# Patient Record
Sex: MALE
[De-identification: names, ages, dates, MRNs, and addresses within clinical notes are randomized per-mention and may not be internally consistent; named-entity substitution may affect disease eponyms.]

---

## 2019-03-09 PROBLEM — Z00.00 ENCOUNTER FOR PREVENTIVE HEALTH EXAMINATION: Status: ACTIVE | Noted: 2019-03-09

## 2019-04-23 ENCOUNTER — APPOINTMENT (OUTPATIENT)
Dept: ORTHOPEDIC SURGERY | Facility: CLINIC | Age: 39
End: 2019-04-23

## 2019-05-24 ENCOUNTER — APPOINTMENT (OUTPATIENT)
Dept: ORTHOPEDIC SURGERY | Facility: CLINIC | Age: 39
End: 2019-05-24

## 2023-06-02 ENCOUNTER — EMERGENCY (EMERGENCY)
Facility: HOSPITAL | Age: 43
LOS: 1 days | Discharge: LEFT BEFORE DISCHARGE | End: 2023-06-02
Attending: EMERGENCY MEDICINE | Admitting: EMERGENCY MEDICINE
Payer: COMMERCIAL

## 2023-06-02 ENCOUNTER — EMERGENCY (EMERGENCY)
Facility: HOSPITAL | Age: 43
LOS: 1 days | Discharge: HOME | End: 2023-06-02
Attending: EMERGENCY MEDICINE | Admitting: EMERGENCY MEDICINE
Payer: COMMERCIAL

## 2023-06-02 VITALS
RESPIRATION RATE: 17 BRPM | SYSTOLIC BLOOD PRESSURE: 129 MMHG | OXYGEN SATURATION: 98 % | TEMPERATURE: 98 F | DIASTOLIC BLOOD PRESSURE: 83 MMHG | HEART RATE: 84 BPM

## 2023-06-02 VITALS
SYSTOLIC BLOOD PRESSURE: 133 MMHG | HEART RATE: 92 BPM | TEMPERATURE: 98 F | HEIGHT: 68 IN | DIASTOLIC BLOOD PRESSURE: 96 MMHG | OXYGEN SATURATION: 98 % | WEIGHT: 174.17 LBS | RESPIRATION RATE: 18 BRPM

## 2023-06-02 VITALS
SYSTOLIC BLOOD PRESSURE: 151 MMHG | HEIGHT: 72 IN | DIASTOLIC BLOOD PRESSURE: 95 MMHG | HEART RATE: 113 BPM | TEMPERATURE: 99 F | OXYGEN SATURATION: 97 % | WEIGHT: 184.97 LBS | RESPIRATION RATE: 16 BRPM

## 2023-06-02 DIAGNOSIS — Z23 ENCOUNTER FOR IMMUNIZATION: ICD-10-CM

## 2023-06-02 DIAGNOSIS — M79.644 PAIN IN RIGHT FINGER(S): ICD-10-CM

## 2023-06-02 DIAGNOSIS — L08.9 LOCAL INFECTION OF THE SKIN AND SUBCUTANEOUS TISSUE, UNSPECIFIED: ICD-10-CM

## 2023-06-02 LAB
CRP SERPL-MCNC: <2 MG/L — SIGNIFICANT CHANGE UP (ref 0–9)
HCT VFR BLD CALC: 41.8 % — SIGNIFICANT CHANGE UP (ref 39–50)
HGB BLD-MCNC: 14.6 G/DL — SIGNIFICANT CHANGE UP (ref 13–17)
MCHC RBC-ENTMCNC: 30.5 PG — SIGNIFICANT CHANGE UP (ref 27–34)
MCHC RBC-ENTMCNC: 34.9 GM/DL — SIGNIFICANT CHANGE UP (ref 32–36)
MCV RBC AUTO: 87.4 FL — SIGNIFICANT CHANGE UP (ref 80–100)
NRBC # BLD: 0 /100 WBCS — SIGNIFICANT CHANGE UP (ref 0–0)
PLATELET # BLD AUTO: 205 K/UL — SIGNIFICANT CHANGE UP (ref 150–400)
RBC # BLD: 4.78 M/UL — SIGNIFICANT CHANGE UP (ref 4.2–5.8)
RBC # FLD: 13.9 % — SIGNIFICANT CHANGE UP (ref 10.3–14.5)
WBC # BLD: 5.83 K/UL — SIGNIFICANT CHANGE UP (ref 3.8–10.5)
WBC # FLD AUTO: 5.83 K/UL — SIGNIFICANT CHANGE UP (ref 3.8–10.5)

## 2023-06-02 PROCEDURE — 73140 X-RAY EXAM OF FINGER(S): CPT | Mod: 26,RT

## 2023-06-02 PROCEDURE — L9991: CPT

## 2023-06-02 PROCEDURE — 99284 EMERGENCY DEPT VISIT MOD MDM: CPT

## 2023-06-02 RX ORDER — ACETAMINOPHEN 500 MG
975 TABLET ORAL ONCE
Refills: 0 | Status: COMPLETED | OUTPATIENT
Start: 2023-06-02 | End: 2023-06-02

## 2023-06-02 RX ORDER — KETOROLAC TROMETHAMINE 30 MG/ML
15 SYRINGE (ML) INJECTION ONCE
Refills: 0 | Status: DISCONTINUED | OUTPATIENT
Start: 2023-06-02 | End: 2023-06-02

## 2023-06-02 RX ORDER — CEFAZOLIN SODIUM 1 G
2000 VIAL (EA) INJECTION ONCE
Refills: 0 | Status: COMPLETED | OUTPATIENT
Start: 2023-06-02 | End: 2023-06-02

## 2023-06-02 RX ORDER — TETANUS TOXOID, REDUCED DIPHTHERIA TOXOID AND ACELLULAR PERTUSSIS VACCINE, ADSORBED 5; 2.5; 8; 8; 2.5 [IU]/.5ML; [IU]/.5ML; UG/.5ML; UG/.5ML; UG/.5ML
0.5 SUSPENSION INTRAMUSCULAR ONCE
Refills: 0 | Status: COMPLETED | OUTPATIENT
Start: 2023-06-02 | End: 2023-06-02

## 2023-06-02 RX ADMIN — Medication 975 MILLIGRAM(S): at 21:59

## 2023-06-02 RX ADMIN — Medication 15 MILLIGRAM(S): at 21:59

## 2023-06-02 RX ADMIN — TETANUS TOXOID, REDUCED DIPHTHERIA TOXOID AND ACELLULAR PERTUSSIS VACCINE, ADSORBED 0.5 MILLILITER(S): 5; 2.5; 8; 8; 2.5 SUSPENSION INTRAMUSCULAR at 22:37

## 2023-06-02 RX ADMIN — Medication 100 MILLIGRAM(S): at 20:55

## 2023-06-02 NOTE — ED PROVIDER NOTE - OBJECTIVE STATEMENT
He was sent here from [facility] for further evaluation due to concerns about possible tenosynovitis.  He states he sustained a laceration to the dorsum of the right 5th digit of his right hand (PIPJ) on a broken glass while doing dishes.  He did not seek medical attention, but he thinks the laceration was deep, and thinks it went down to the tendon.  Over the last few days, the finger has become more swollen, and there is drainage from the wound (which has now started to close by secondary intention).  He denies any fevers/chills, but he had a low grade fever in triage.  He has pain with ROM.  No weakness/numbness.  He was prescribed a 3rd generation oral cephalosporin to take twice/day, but the provider at [facility] told him to come here because he needed IV antibiotics.  The patient is right hand dominant, so he is very anxious about having any complications in that hand.  He has no previous hx of delayed healing or MRSA infections.  He is unsure of his last tetanus immunization.

## 2023-06-02 NOTE — ED ADULT NURSE NOTE - NSFALLUNIVINTERV_ED_ALL_ED
Bed/Stretcher in lowest position, wheels locked, appropriate side rails in place/Call bell, personal items and telephone in reach/Instruct patient to call for assistance before getting out of bed/chair/stretcher/Non-slip footwear applied when patient is off stretcher/[place] to call system/Physically safe environment - no spills, clutter or unnecessary equipment/Purposeful proactive rounding/Room/bathroom lighting operational, light cord in reach

## 2023-06-02 NOTE — ED ADULT TRIAGE NOTE - CHIEF COMPLAINT QUOTE
right hand 5th digit swelling and pain s/p cutting finger with glass on monday, came to er earlier today but left because he had movie tickets

## 2023-06-02 NOTE — ED PROVIDER NOTE - PATIENT PORTAL LINK FT
You can access the FollowMyHealth Patient Portal offered by [facility] by registering at the following website: [email or web address]. By joining [employer]’s FollowMyHealth portal, you will also be able to view your health information using other applications (apps) compatible with our system.

## 2023-06-02 NOTE — ED PROVIDER NOTE - CARE PROVIDER_API CALL
Blair Avila  Plastic Surgery  [address], [address]  [place] [address]  Phone: [phone]  Fax: [phone]  Follow Up Time: 1-3 Days

## 2023-06-02 NOTE — ED ADULT NURSE NOTE - CADM POA PRESS ULCER
Anesthesia Evaluation                  Airway   Mallampati: III  no difficulty expected  Dental - normal exam     Pulmonary - normal exam   (+) pulmonary embolism, a smoker Current Abstained day of surgery, shortness of breath,     PE comment: nonlabored  Cardiovascular - normal exam    Rhythm: regular  Rate: normal    (+) valvular problems/murmurs (Severe AS), MIGUEL,       Neuro/Psych  (+) numbness, psychiatric history Anxiety,     GI/Hepatic/Renal/Endo      Musculoskeletal     (+) back pain, chronic pain,   Abdominal    Substance History      OB/GYN          Other   (+) arthritis   history of cancer                    Anesthesia Plan    ASA 4     MAC   (MAC vs GA as needed to provide adequate sedation)  intravenous induction   Anesthetic plan, all risks, benefits, and alternatives have been provided, discussed and informed consent has been obtained with: patient and child.       No

## 2023-06-02 NOTE — ED ADULT TRIAGE NOTE - CHIEF COMPLAINT QUOTE
patient walk in c/o infection to right pinky finger since Monday; glass broke while washing dishes and now joint is swollen, red and painful; sent by [facility] r/o tenosynovitis

## 2023-06-02 NOTE — ED ADULT NURSE NOTE - OBJECTIVE STATEMENT
Pt is a 43y male c/o right 5th digit swelling and pain worsening today. Pt states 5 days ago she was washing dishes and cut his finger on glass. Presented to ER earlier today then left. States he also went to UC and was given an abx.

## 2023-06-02 NOTE — ED PROVIDER NOTE - CLINICAL SUMMARY MEDICAL DECISION MAKING FREE TEXT BOX
Pt seen for wound infection.  Clinically, no signs of tenosynovitis.  Pt given a dose of IV Ancef.  Labs unremarkable.  Discussed with hand surgeon on call, who agreed with the plan and agreed to see the patient in his office for follow-up.  He was placed in a volar splint to immobilize for the next few days, then gentle ROM as tolerated.  I advised patient that I could not rule out the possibility of an occult extensor tendon injury, which is why he should arrange F/U appointment with Hand Surgery.

## 2023-06-02 NOTE — ED ADULT NURSE NOTE - OBJECTIVE STATEMENT
Pt aox4, patient walk in c/o infection to right pinky finger since Monday; glass broke while washing dishes and now joint is swollen, red and painful; sent by [facility] r/o tenosynovitis

## 2023-06-02 NOTE — ED PROVIDER NOTE - MUSCULOSKELETAL, MLM
Right hand:  there is localized swelling (moderate) involving the 5th digit, where there is what appears to be a subacute healing wound over the PIPJ with a trace amount of purulent drainage.  No large fluid collection is appreciated.  There is good capillary refill.  He has fairly good ROM in the finger and no significant increase in pain with passive extension or flexion.  Motor/sensation intact.  No significant tenderness along the tendon sheath.  Rest of the fingers & hand nontender without erythema or evidence of cellulitis or lymphangitis.

## 2023-06-02 NOTE — ED ADULT NURSE NOTE - NSFALLCONCLUSION_ED_ALL_ED
----- Message from CHAUNCEY Ramirez sent at 9/13/2017  2:58 PM EDT -----  Please call patient with results. Xray WNL< shows acute spondolylosis at L5-S1, will refer to ortho for this and call with appt.    Pt informed of Xray results  
Universal Safety Interventions

## 2023-06-02 NOTE — ED PROVIDER NOTE - NSFOLLOWUPINSTRUCTIONS_ED_ALL_ED_FT
A wound infection happens when tiny organisms (microorganisms) start to grow in a wound. A wound infection is most often caused by bacteria. Infection can cause the wound to break open or worsen. Wound infection needs treatment. If a wound infection is left untreated, complications can occur. Untreated wound infections may lead to an infection in the bloodstream (septicemia) or a bone infection (osteomyelitis).    What are the causes?  This condition is most often caused by bacteria growing in a wound. Other microorganisms, like yeast and fungi, can also cause wound infections.    What increases the risk?  The following factors may make you more likely to develop this condition:    Having a weak body defense system (immune system).  Having diabetes.  Taking steroid medicines for a long time (chronic use).  Smoking.  Being an older person.  Being overweight.  Taking chemotherapy medicines.    What are the signs or symptoms?  Symptoms of this condition include:    Having more redness, swelling, or pain at the wound site.  Having more blood or fluid at the wound site.  A bad smell coming from a wound or bandage (dressing).  Having a fever.  Feeling tired or fatigued.  Having warmth at or around the wound.  Having pus at the wound site.    How is this diagnosed?  This condition is diagnosed with a medical history and physical exam. You may also have a wound culture or blood tests or both.    How is this treated?  This condition is usually treated with an antibiotic medicine.    The infection should improve 24–48 hours after you start antibiotics.  After 24–48 hours, redness around the wound should stop spreading, and the wound should be less painful.    Follow these instructions at home:      Medicines    Take or apply over-the-counter and prescription medicines only as told by your health care provider.  If you were prescribed an antibiotic medicine, take or apply it as told by your health care provider. Do not stop using the antibiotic even if you start to feel better.        Wound care     Clean the wound each day, or as told by your health care provider.    Wash the wound with mild soap and water.  Rinse the wound with water to remove all soap.  Pat the wound dry with a clean towel. Do not rub it.  Follow instructions from your health care provider about how to take care of your wound. Make sure you:    Wash your hands with soap and water before and after you change your dressing. If soap and water are not available, use hand .  Change your dressing as told by your health care provider.  Leave stitches (sutures), skin glue, or adhesive strips in place if your wound has been closed. These skin closures may need to stay in place for 2 weeks or longer. If adhesive strip edges start to loosen and curl up, you may trim the loose edges. Do not remove adhesive strips completely unless your health care provider tells you to do that. Some wounds are left open to heal on their own.  Check your wound every day for signs of infection. Watch for:    More redness, swelling, or pain.  More fluid or blood.  Warmth.  Pus or a bad smell.        General instructions    Keep the dressing dry until your health care provider says it can be removed.  Do not take baths, swim, or use a hot tub until your health care provider approves. Ask your health care provider if you may take showers. You may only be allowed to take sponge baths.  Raise (elevate) the injured area above the level of your heart while you are sitting or lying down.  Do not scratch or pick at the wound.  Keep all follow-up visits as told by your health care provider. This is important.    Contact a health care provider if:  Your pain is not controlled with medicine.  You have more redness, swelling, or pain around your wound.  You have more fluid or blood coming from your wound.  Your wound feels warm to the touch.  You have pus coming from your wound.  You continue to notice a bad smell coming from your wound or your dressing.  Your wound that was closed breaks open.    Get help right away if:  You have a red streak going away from your wound.  You have a fever.    Summary  A wound infection happens when tiny organisms (microorganisms) start to grow in a wound.  This condition is usually treated with an antibiotic medicine.  Follow instructions from your health care provider about how to take care of your wound.  Contact a health care provider if your wound infection does not begin to improve in 24–48 hours, or your symptoms worsen.  Keep all follow-up visits as told by your health care provider. This is important.

## 2023-06-02 NOTE — ED ADULT NURSE REASSESSMENT NOTE - NS ED NURSE REASSESS COMMENT FT1
Pt. received AAOx4 semi fowlers in stretcher breathing at ease on room air in NAD. Swelling noted to R. 5th digit. Abx given. Pending dispo.

## 2023-06-06 DIAGNOSIS — M79.644 PAIN IN RIGHT FINGER(S): ICD-10-CM

## 2023-06-06 DIAGNOSIS — Z53.21 PROCEDURE AND TREATMENT NOT CARRIED OUT DUE TO PATIENT LEAVING PRIOR TO BEING SEEN BY HEALTH CARE PROVIDER: ICD-10-CM
